# Patient Record
Sex: FEMALE | Race: WHITE | NOT HISPANIC OR LATINO | Employment: OTHER | URBAN - METROPOLITAN AREA
[De-identification: names, ages, dates, MRNs, and addresses within clinical notes are randomized per-mention and may not be internally consistent; named-entity substitution may affect disease eponyms.]

---

## 2019-06-02 ENCOUNTER — OFFICE VISIT (OUTPATIENT)
Dept: URGENT CARE | Facility: CLINIC | Age: 64
End: 2019-06-02
Payer: OTHER GOVERNMENT

## 2019-06-02 VITALS
SYSTOLIC BLOOD PRESSURE: 118 MMHG | OXYGEN SATURATION: 97 % | TEMPERATURE: 99 F | DIASTOLIC BLOOD PRESSURE: 77 MMHG | RESPIRATION RATE: 16 BRPM | WEIGHT: 142 LBS | HEART RATE: 72 BPM | HEIGHT: 66 IN | BODY MASS INDEX: 22.82 KG/M2

## 2019-06-02 DIAGNOSIS — M79.675 PAIN OF TOE OF LEFT FOOT: Primary | ICD-10-CM

## 2019-06-02 PROCEDURE — 73660 XR TOE 2 OR MORE VIEWS LEFT: ICD-10-PCS | Mod: LT,S$GLB,, | Performed by: RADIOLOGY

## 2019-06-02 PROCEDURE — 99203 OFFICE O/P NEW LOW 30 MIN: CPT | Mod: S$GLB,,, | Performed by: FAMILY MEDICINE

## 2019-06-02 PROCEDURE — 73660 X-RAY EXAM OF TOE(S): CPT | Mod: LT,S$GLB,, | Performed by: RADIOLOGY

## 2019-06-02 PROCEDURE — 99203 PR OFFICE/OUTPT VISIT, NEW, LEVL III, 30-44 MIN: ICD-10-PCS | Mod: S$GLB,,, | Performed by: FAMILY MEDICINE

## 2019-06-02 RX ORDER — NAPROXEN 500 MG/1
500 TABLET ORAL 2 TIMES DAILY
COMMUNITY
End: 2019-06-02 | Stop reason: ALTCHOICE

## 2019-06-02 RX ORDER — MELOXICAM 15 MG/1
15 TABLET ORAL DAILY PRN
Qty: 10 TABLET | Refills: 0 | Status: SHIPPED | OUTPATIENT
Start: 2019-06-02 | End: 2019-06-12

## 2019-06-02 RX ORDER — LEVOTHYROXINE SODIUM 75 UG/1
75 TABLET ORAL DAILY
COMMUNITY
End: 2022-12-15 | Stop reason: CLARIF

## 2019-06-02 NOTE — PROGRESS NOTES
"Subjective:       Patient ID: Sonja Ortiz is a 63 y.o. female.    Vitals:  height is 5' 6" (1.676 m) and weight is 64.4 kg (142 lb). Her oral temperature is 98.5 °F (36.9 °C). Her blood pressure is 118/77 and her pulse is 72. Her respiration is 16 and oxygen saturation is 97%.     Chief Complaint: Toe Pain    Injured left third toe this am. Toe is purple and swollen.    Toe Pain    The incident occurred 6 to 12 hours ago. The incident occurred at home. The injury mechanism was a direct blow. The pain is present in the left foot. The patient is experiencing no pain. She has tried ice and elevation for the symptoms. The treatment provided no relief.       Constitution: Negative for fatigue.   HENT: Negative for facial swelling and facial trauma.    Neck: Negative for neck stiffness.   Cardiovascular: Negative for chest trauma.   Eyes: Negative for eye trauma, double vision and blurred vision.   Gastrointestinal: Negative for abdominal trauma, abdominal pain and rectal bleeding.   Genitourinary: Negative for hematuria, missed menses, genital trauma and pelvic pain.   Musculoskeletal: Negative for pain, trauma, joint swelling and abnormal ROM of joint.   Skin: Positive for color change. Negative for wound, abrasion, laceration and bruising.   Neurological: Negative for dizziness, history of vertigo, light-headedness, coordination disturbances, altered mental status and loss of consciousness.   Hematologic/Lymphatic: Negative for history of bleeding disorder.   Psychiatric/Behavioral: Negative for altered mental status.       Objective:      Physical Exam   Constitutional: She appears well-developed and well-nourished.   HENT:   Head: Normocephalic and atraumatic.   Musculoskeletal:   Left Foot: Ecchymosis of 3rd digit. Pain with palpation of distal aspect of 3rd digit. Good ROM of digits.   Psychiatric: She has a normal mood and affect. Her behavior is normal.   Nursing note and vitals reviewed.        Type of " Interpretation: ED Physician (Independently Interpreted).  Radiology Procedure Done: Left 3rd Toe.  Interpretation: Non-displaced Fx of proximal phlaynx        Assessment:       1. Pain of toe of left foot        Plan:         Pain of toe of left foot  -     X-Ray Toe 2 or More Views Left; Future; Expected date: 06/02/2019  -     meloxicam (MOBIC) 15 MG tablet; Take 1 tablet (15 mg total) by mouth daily as needed.  Dispense: 10 tablet; Refill: 0        -     Toe buddy taped. She did not want to do post-op shoe.        -     F/U with PCP

## 2022-12-15 ENCOUNTER — OFFICE VISIT (OUTPATIENT)
Dept: URGENT CARE | Facility: CLINIC | Age: 67
End: 2022-12-15
Payer: MEDICARE

## 2022-12-15 VITALS
HEART RATE: 80 BPM | TEMPERATURE: 98 F | BODY MASS INDEX: 22.82 KG/M2 | SYSTOLIC BLOOD PRESSURE: 200 MMHG | RESPIRATION RATE: 16 BRPM | OXYGEN SATURATION: 100 % | DIASTOLIC BLOOD PRESSURE: 100 MMHG | WEIGHT: 142 LBS | HEIGHT: 66 IN

## 2022-12-15 DIAGNOSIS — R42 DIZZINESS: ICD-10-CM

## 2022-12-15 DIAGNOSIS — R20.0 ARM NUMBNESS LEFT: ICD-10-CM

## 2022-12-15 DIAGNOSIS — R03.0 ELEVATED BLOOD PRESSURE READING: Primary | ICD-10-CM

## 2022-12-15 DIAGNOSIS — R07.9 CHEST PAIN, UNSPECIFIED TYPE: ICD-10-CM

## 2022-12-15 PROBLEM — R00.2 PALPITATION: Status: ACTIVE | Noted: 2022-12-15

## 2022-12-15 PROBLEM — E87.6 HYPOKALEMIA: Status: ACTIVE | Noted: 2022-12-15

## 2022-12-15 PROBLEM — E03.9 HYPOTHYROIDISM: Status: ACTIVE | Noted: 2022-12-15

## 2022-12-15 PROBLEM — R20.2 NUMBNESS AND TINGLING IN LEFT ARM: Status: ACTIVE | Noted: 2022-12-15

## 2022-12-15 PROBLEM — Z71.89 ACP (ADVANCE CARE PLANNING): Status: ACTIVE | Noted: 2022-12-15

## 2022-12-15 PROCEDURE — 93005 ELECTROCARDIOGRAM TRACING: CPT | Mod: S$GLB,,, | Performed by: NURSE PRACTITIONER

## 2022-12-15 PROCEDURE — 99204 PR OFFICE/OUTPT VISIT, NEW, LEVL IV, 45-59 MIN: ICD-10-PCS | Mod: S$GLB,,, | Performed by: NURSE PRACTITIONER

## 2022-12-15 PROCEDURE — 93010 ELECTROCARDIOGRAM REPORT: CPT | Mod: S$PBB,,, | Performed by: INTERNAL MEDICINE

## 2022-12-15 PROCEDURE — 99204 OFFICE O/P NEW MOD 45 MIN: CPT | Mod: S$GLB,,, | Performed by: NURSE PRACTITIONER

## 2022-12-15 PROCEDURE — 93010 EKG 12-LEAD: ICD-10-PCS | Mod: S$PBB,,, | Performed by: INTERNAL MEDICINE

## 2022-12-15 PROCEDURE — 93005 EKG 12-LEAD: ICD-10-PCS | Mod: S$GLB,,, | Performed by: NURSE PRACTITIONER

## 2022-12-15 NOTE — PATIENT INSTRUCTIONS

## 2022-12-15 NOTE — PROGRESS NOTES
"Subjective:       Patient ID: Sonja Ortiz is a 67 y.o. female.    Vitals:  height is 5' 6" (1.676 m) and weight is 64.4 kg (142 lb). Her oral temperature is 97.8 °F (36.6 °C). Her blood pressure is 209/105 (abnormal) and her pulse is 80. Her respiration is 16 and oxygen saturation is 100%.     Chief Complaint: Hypertension    Pt states 20 mins ago numbing and tingling in left arm, BP at home 187/110, daughter states she looks weak. Patient wants EKG. Given aspirin 81mg 20 min ago.      Provider note begins below:  67-year-old female with PMHx of breast cancer and hypothyroidism presents to urgent care with left arm numbness and dizziness that started prior to arrival.  Patient also reports occasional palpitations.  Daughter reports that she was slow to respond while driving to the urgent care today.  Patient denies any current chest pain, shortness of breath, nausea, HA, diaphoresis, syncope, leg swelling, visual changes or abdominal pain.  Patient is awake alert.  Answering questions appropriately.  Speech is clear.  Afebrile.    Hypertension  This is a new problem. The current episode started today. The problem has been gradually worsening since onset. Associated symptoms include palpitations. Pertinent negatives include no chest pain. There are no associated agents to hypertension. There are no known risk factors for coronary artery disease. Past treatments include nothing.     Constitution: Negative. Negative for chills, sweating and fatigue.   HENT: Negative.  Negative for ear pain, facial swelling, congestion and sore throat.    Neck: Negative for painful lymph nodes.   Cardiovascular:  Positive for palpitations. Negative for chest trauma, chest pain and sob on exertion.   Eyes: Negative.  Negative for eye itching and eye pain.   Respiratory: Negative.  Negative for chest tightness, cough and asthma.    Gastrointestinal: Negative.  Negative for nausea, vomiting and diarrhea.   Endocrine: negative. cold " intolerance and excessive thirst.   Genitourinary: Negative.  Negative for dysuria, frequency, urgency and hematuria.   Musculoskeletal:  Negative for pain, trauma and joint pain.   Skin: Negative.  Negative for rash, wound and hives.   Allergic/Immunologic: Negative.  Negative for eczema, asthma, hives and itching.   Neurological:  Positive for numbness and tingling. Negative for disorientation and altered mental status.   Hematologic/Lymphatic: Negative.  Negative for swollen lymph nodes.   Psychiatric/Behavioral: Negative.  Negative for altered mental status, disorientation and confusion.      Objective:      Physical Exam   Constitutional: She is oriented to person, place, and time. She appears well-developed. She is cooperative.  Non-toxic appearance. She does not appear ill. No distress.   HENT:   Head: Normocephalic and atraumatic.   Ears:   Right Ear: Hearing, tympanic membrane, external ear and ear canal normal.   Left Ear: Hearing, tympanic membrane, external ear and ear canal normal.   Nose: Nose normal. No mucosal edema, rhinorrhea, nasal deformity or congestion. No epistaxis. Right sinus exhibits no maxillary sinus tenderness and no frontal sinus tenderness. Left sinus exhibits no maxillary sinus tenderness and no frontal sinus tenderness.   Mouth/Throat: Uvula is midline, oropharynx is clear and moist and mucous membranes are normal. No trismus in the jaw. Normal dentition. No uvula swelling. No posterior oropharyngeal erythema.   Eyes: Conjunctivae and lids are normal. No visual field deficit is present. Right eye exhibits no discharge. Left eye exhibits no discharge. No scleral icterus.   Neck: Trachea normal and phonation normal. Neck supple. No neck rigidity present.   Cardiovascular: Normal rate, regular rhythm, normal heart sounds and normal pulses.   Pulmonary/Chest: Effort normal and breath sounds normal. No stridor. No respiratory distress. She has no wheezes. She has no rhonchi. She has no  rales. She exhibits no tenderness.   Abdominal: Normal appearance and bowel sounds are normal. She exhibits no distension and no mass. Soft. There is no abdominal tenderness. There is no left CVA tenderness and no right CVA tenderness.   Musculoskeletal: Normal range of motion.         General: No deformity. Normal range of motion.   Lymphadenopathy:     She has no cervical adenopathy.   Neurological: no focal deficit. She is alert and oriented to person, place, and time. She has normal motor skills and normal sensation. She displays no tremor and facial symmetry. She exhibits normal muscle tone. Coordination: Heel to shin test normal. She shows no pronator drift. She displays no seizure activity. Gait and coordination normal. Coordination normal. GCS eye subscore is 4. GCS verbal subscore is 5. GCS motor subscore is 6.      Comments: Steady gate.   Skin: Skin is warm, dry, intact, not diaphoretic and not pale.   Psychiatric: Her speech is normal and behavior is normal. Mood, judgment and thought content normal.   Nursing note and vitals reviewed.  EKG NSR. left ventricular hypertrophy with repolarization abnormality HR 70 bpm.  No ST-elevation.      Assessment:       1. Elevated blood pressure reading    2. Chest pain, unspecified type    3. Arm numbness left    4. Dizziness          Plan:       FOLLOWUP  No follow-ups on file.     PATIENT INSTRUCTIONS  Patient Instructions   INSTRUCTIONS:  - Rest.  - Drink plenty of fluids.  - Take Tylenol and/or Ibuprofen as directed as needed for fever/pain.  Do not take more than the recommended dose.  - follow up with your PCP within the next 1-2 weeks as needed.  - You must understand that you have received an Urgent Care treatment only and that you may be released before all of your medical problems are known or treated.   - You, the patient, will arrange for follow up care as instructed.   - If your condition worsens or fails to improve we recommend that you receive another  evaluation at the ER immediately or contact your PCP to discuss your concerns.   - You can call [phone] or [phone] to help schedule an appointment with the appropriate provider.     -If you smoke cigarettes, it would be beneficial for you to stop.         THANK YOU FOR ALLOWING ME TO PARTICIPATE IN YOUR HEALTHCARE,     Raleigh Conley NP   Elevated blood pressure reading  -     Refer to Emergency Dept.    Chest pain, unspecified type  -     IN OFFICE EKG 12-LEAD (to Muse)    Arm numbness left  -     Refer to Emergency Dept.    Dizziness  -     Refer to Emergency Dept.

## 2022-12-16 PROBLEM — Q21.12 PFO (PATENT FORAMEN OVALE): Status: ACTIVE | Noted: 2022-12-16

## 2023-01-13 ENCOUNTER — OFFICE VISIT (OUTPATIENT)
Dept: CARDIOLOGY | Facility: CLINIC | Age: 68
End: 2023-01-13
Payer: OTHER GOVERNMENT

## 2023-01-13 VITALS
SYSTOLIC BLOOD PRESSURE: 132 MMHG | BODY MASS INDEX: 23.76 KG/M2 | HEIGHT: 65 IN | HEART RATE: 71 BPM | WEIGHT: 142.63 LBS | DIASTOLIC BLOOD PRESSURE: 81 MMHG

## 2023-01-13 DIAGNOSIS — I10 PRIMARY HYPERTENSION: ICD-10-CM

## 2023-01-13 DIAGNOSIS — R00.2 PALPITATION: ICD-10-CM

## 2023-01-13 DIAGNOSIS — E03.9 HYPOTHYROIDISM, UNSPECIFIED TYPE: ICD-10-CM

## 2023-01-13 DIAGNOSIS — E78.5 DYSLIPIDEMIA: ICD-10-CM

## 2023-01-13 DIAGNOSIS — Q21.12 PFO (PATENT FORAMEN OVALE): ICD-10-CM

## 2023-01-13 PROCEDURE — 99999 PR PBB SHADOW E&M-EST. PATIENT-LVL III: CPT | Mod: PBBFAC,,,

## 2023-01-13 PROCEDURE — 99214 PR OFFICE/OUTPT VISIT, EST, LEVL IV, 30-39 MIN: ICD-10-PCS | Mod: S$PBB,,,

## 2023-01-13 PROCEDURE — 99999 PR PBB SHADOW E&M-EST. PATIENT-LVL III: ICD-10-PCS | Mod: PBBFAC,,,

## 2023-01-13 PROCEDURE — 99213 OFFICE O/P EST LOW 20 MIN: CPT | Mod: PBBFAC,PO

## 2023-01-13 PROCEDURE — 99214 OFFICE O/P EST MOD 30 MIN: CPT | Mod: S$PBB,,,

## 2023-01-13 RX ORDER — SODIUM CHLORIDE 9 MG/ML
INJECTION, SOLUTION INTRAVENOUS ONCE
Status: CANCELLED | OUTPATIENT
Start: 2023-01-13 | End: 2023-01-13

## 2023-01-13 RX ORDER — ROSUVASTATIN CALCIUM 10 MG/1
10 TABLET, COATED ORAL DAILY
Qty: 90 TABLET | Refills: 3 | Status: SHIPPED | OUTPATIENT
Start: 2023-01-13 | End: 2023-12-04

## 2023-01-13 NOTE — ASSESSMENT & PLAN NOTE
Total 263    Start on Crestor 10 mg and uptitrate as needed     Low level/low impact aerobic exercise 5x's/wk recommended. Heart healthy diet and risk factor modification discussed with patient.

## 2023-01-13 NOTE — PATIENT INSTRUCTIONS
DAYANA    Arrive for your procedure at:  [facility]      FASTING:  You MAY NOT have anything to eat or drink AFTER MIDNIGHT.  If your procedure is scheduled in the afternoon, you may have a LIGHT BREAKFAST BEFORE 6:00 A.M.  For example: Two slices of toast; black coffee or black tea.    MEDICATIONS:  You may take your regular morning medications with a small sip of water.     Hold or adjust the following:  Fluid pills.  Diabetes medications.    Continue: Coumadin, Plavix, Effient, Aspirin, Anti-coagulants, Blood thinners    Please refer to pre-op instructions received from [facility].

## 2023-01-13 NOTE — ASSESSMENT & PLAN NOTE
Will arrange for DAYANA with Dr. Mohr given recurrence of symptoms and positive bubble study during admit   Risks and benefits discussed and she wishes to proceed

## 2023-01-13 NOTE — PROGRESS NOTES
" Subjective:    Patient ID:  Sonja Ortiz is a 67 y.o. female patient here for evaluation Hypertension (Post hosp f/u )      History of Present Illness:     Mrs. Ortiz is a new patient to our clinic after she was admitted to [facility] with stroke like symptoms. MRI and CT head negative. She had a negative nuclear stress test and an echo that showed pEF with positive bubble study. She is going to follow up with Dr. Mohr from this point forward in our clinic.   She has been feeling much better- she did go to the [place] ED a few days after discharge from [facility] with recurring symptoms-- weakness, left arm numbness and tingling, but was sent home as all her tests came back normal again per patient.   She does still have occasional finger tingling but if she "pops her wrist" it goes away.   She does still have occasional palpitations but they are better with metoprolol. She is on thyroid medication at higher than needed doses as to suppress recurrence of thyroid cancer she was told. She is going to establish care with endocrine here soon.   She does also get dizzy and feels off balance from time to time. Does have history of vertigo.  No SOB, weakness, chest pain, dizziness, syncope, falls,  neck/arm/jaw pain.     She was evaluated by neurology as an outpatient who did not feel she had TIA and referred her to ENT for a vertigo workup.    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  MUSCULOSKELETAL: No myalgias  NEURO: No lightheadedness or dizziness  EYES: No Double vision, blurry, vision or headache        Objective        Vitals:    01/13/23 0816   BP: 132/81   Pulse: 71       LIPIDS - LAST 2   Lab Results   Component Value Date    CHOL 263 (H) 12/16/2022    HDL 69 12/16/2022    LDLCALC 178.6 (H) 12/16/2022    TRIG 77 12/16/2022    CHOLHDL 26.2 12/16/2022     CBC - LAST 2  Lab Results   Component Value Date    WBC 8.13 12/16/2022    WBC 7.27 12/15/2022    RBC 4.43 12/16/2022    RBC 4.32 12/15/2022    HGB 13.7 12/16/2022    HGB 13.4 12/15/2022    HCT 40.0 12/16/2022    HCT 38.1 12/15/2022    MCV 90 12/16/2022    MCV 88 12/15/2022    MCH 30.9 12/16/2022    MCH 31.0 12/15/2022    MCHC 34.3 12/16/2022    MCHC 35.2 12/15/2022    RDW 13.6 12/16/2022    RDW 13.2 12/15/2022     12/16/2022     12/15/2022    MPV 9.6 12/16/2022    MPV 9.1 (L) 12/15/2022    GRAN 3.8 12/16/2022    GRAN 46.5 12/16/2022    LYMPH 3.3 12/16/2022    LYMPH 40.7 12/16/2022    MONO 0.9 12/16/2022    MONO 11.4 12/16/2022    BASO 0.05 12/16/2022    BASO 0.03 12/15/2022    NRBC 0 12/16/2022    NRBC 0 12/15/2022     CHEMISTRY & LIVER FUNCTION - LAST 2  Lab Results   Component Value Date     12/16/2022     (L) 12/15/2022    K 4.3 12/16/2022    K 3.4 (L) 12/15/2022     12/16/2022    CL 99 12/15/2022    CO2 29 12/16/2022    CO2 27 12/15/2022    ANIONGAP 4 (L) 12/16/2022    ANIONGAP 7 (L) 12/15/2022    BUN 7 12/16/2022    BUN 7 12/15/2022    CREATININE 0.57 12/16/2022    CREATININE 0.53 12/15/2022    GLU 84 12/16/2022     (H) 12/15/2022    CALCIUM 9.5 12/16/2022    CALCIUM 9.2 12/15/2022    MG 2.3 12/16/2022    ALBUMIN 4.3 12/16/2022    ALBUMIN 4.4 12/15/2022    PROT 8.1 12/16/2022     PROT 8.0 12/15/2022    ALKPHOS 63 12/16/2022    ALKPHOS 77 12/15/2022    ALT 22 12/16/2022    ALT 23 12/15/2022    AST 41 (H) 12/16/2022    AST 36 12/15/2022    BILITOT 1.8 (H) 12/16/2022    BILITOT 1.5 (H) 12/15/2022      CARDIAC PROFILE - LAST 2  Lab Results   Component Value Date    TROPONINI 0.021 12/15/2022    TROPONINI <0.012 12/15/2022      COAGULATION - LAST 2  Lab Results   Component Value Date    LABPT 12.9 12/15/2022    INR 1.0 12/15/2022    APTT 32.9 12/15/2022     ENDOCRINE & PSA - LAST 2  Lab Results   Component Value Date    HGBA1C 5.2 12/16/2022    TSH 4.600 (H) 12/15/2022      ECHOCARDIOGRAM RESULTS  Results for orders placed during the hospital encounter of 12/15/22    Echo Saline Bubble? Yes    Interpretation Summary  · The left ventricle is normal in size with normal systolic function.  · The estimated ejection fraction is 60%.  · Normal left ventricular diastolic function.  · Normal right ventricular size with normal right ventricular systolic function.  · Normal central venous pressure (3 mmHg).  · The estimated PA systolic pressure is 25 mmHg.  · Study is positive for intercardiac shunt that is right to left.      CURRENT/PREVIOUS VISIT EKG  Results for orders placed or performed during the hospital encounter of 12/15/22   ECG 12 lead    Collection Time: 12/15/22 12:55 PM    Narrative    Test Reason : R29.818,    Vent. Rate : 072 BPM     Atrial Rate : 072 BPM     P-R Int : 146 ms          QRS Dur : 086 ms      QT Int : 402 ms       P-R-T Axes : 064 -24 078 degrees     QTc Int : 440 ms    Normal sinus rhythm  Minimal voltage criteria for LVH, may be normal variant ( R in aVL )  Abnormal ECG  Confirmed by Natalie Abraham ([id number]) on 12/16/2022 11:42:24 AM    Referred By: CHRISTINA   SELF           Confirmed By:Natalie Abraham     No valid procedures specified.   Results for orders placed during the hospital encounter of 12/15/22    Nuclear Stress - Cardiology Interpreted    Interpretation Summary     Normal myocardial perfusion scan. There is no evidence of myocardial ischemia or infarction.    The visually estimated ejection fraction is normal at rest.    There is normal wall motion at rest.    The ECG portion of the study is negative for ischemia.    There are no prior studies for comparison.    No valid procedures specified.    PHYSICAL EXAM  CONSTITUTIONAL: Well built, well nourished in no apparent distress  NECK: no carotid bruit, no JVD  LUNGS: CTA  CHEST WALL: no tenderness  HEART: regular rate and rhythm, S1, S2 normal, no murmur, click, rub or gallop   ABDOMEN: soft, non-tender; bowel sounds normal; no masses,  no organomegaly  EXTREMITIES: Extremities normal, no edema, no calf tenderness noted  NEURO: AAO X 3    I HAVE REVIEWED :    The vital signs, nurses notes, and all the pertinent radiology and labs.    Current Outpatient Medications   Medication Instructions    aspirin (ECOTRIN) 81 mg, Oral, Three times weekly    cetirizine (ZYRTEC) 10 mg, Oral, Daily PRN    ergocalciferol (ERGOCALCIFEROL) 50,000 Units, Oral, Every 7 days    GENTEAL, HYPROMELLOSE, OPHT 2 drops, Both Eyes, Daily PRN    metoprolol succinate (TOPROL-XL) 25 mg, Oral, Daily    rosuvastatin (CRESTOR) 10 mg, Oral, Daily    SYNTHROID 88 mcg, Oral, Daily        Assessment & Plan     Dyslipidemia  Total 263    Start on Crestor 10 mg and uptitrate as needed     Low level/low impact aerobic exercise 5x's/wk recommended. Heart healthy diet and risk factor modification discussed with patient.       PFO (patent foramen ovale)  Will arrange for DAYANA with Dr. Mohr given recurrence of symptoms and positive bubble study during admit   Risks and benefits discussed and she wishes to proceed     Hypothyroidism  Following with endocrine soon     Primary hypertension  Continue metoprolol 25 mg daily   Low Na diet     Palpitation  Start Magox   Continue torpol 25 mg daily           Follow up in about 6 weeks (around 2/24/2023).

## 2023-02-24 ENCOUNTER — OFFICE VISIT (OUTPATIENT)
Dept: CARDIOLOGY | Facility: CLINIC | Age: 68
End: 2023-02-24
Payer: MEDICARE

## 2023-02-24 VITALS
HEIGHT: 65 IN | WEIGHT: 144.38 LBS | SYSTOLIC BLOOD PRESSURE: 159 MMHG | BODY MASS INDEX: 24.06 KG/M2 | DIASTOLIC BLOOD PRESSURE: 95 MMHG | HEART RATE: 68 BPM

## 2023-02-24 DIAGNOSIS — I10 PRIMARY HYPERTENSION: ICD-10-CM

## 2023-02-24 DIAGNOSIS — R20.2 NUMBNESS AND TINGLING IN LEFT ARM: ICD-10-CM

## 2023-02-24 DIAGNOSIS — R20.0 NUMBNESS AND TINGLING IN LEFT ARM: ICD-10-CM

## 2023-02-24 DIAGNOSIS — E78.5 DYSLIPIDEMIA: ICD-10-CM

## 2023-02-24 DIAGNOSIS — Q21.12 PFO (PATENT FORAMEN OVALE): ICD-10-CM

## 2023-02-24 DIAGNOSIS — E03.9 HYPOTHYROIDISM, UNSPECIFIED TYPE: ICD-10-CM

## 2023-02-24 PROBLEM — R00.2 PALPITATION: Status: RESOLVED | Noted: 2022-12-15 | Resolved: 2023-02-24

## 2023-02-24 PROCEDURE — 99999 PR PBB SHADOW E&M-EST. PATIENT-LVL III: CPT | Mod: PBBFAC,,,

## 2023-02-24 PROCEDURE — 99214 PR OFFICE/OUTPT VISIT, EST, LEVL IV, 30-39 MIN: ICD-10-PCS | Mod: S$PBB,,,

## 2023-02-24 PROCEDURE — 99213 OFFICE O/P EST LOW 20 MIN: CPT | Mod: PBBFAC,PO

## 2023-02-24 PROCEDURE — 99214 OFFICE O/P EST MOD 30 MIN: CPT | Mod: S$PBB,,,

## 2023-02-24 PROCEDURE — 99999 PR PBB SHADOW E&M-EST. PATIENT-LVL III: ICD-10-PCS | Mod: PBBFAC,,,

## 2023-02-24 NOTE — ASSESSMENT & PLAN NOTE
I gave her Crestor last visit but she has not started it yet because she wants to try diet first   PCP will recheck in the next few months

## 2023-02-24 NOTE — ASSESSMENT & PLAN NOTE
Bp high today but reports controlled reading 100-130 systolic and 69-90 diastolic at home   Continue torpol     Low level/low impact aerobic exercise 5x's/wk recommended. Heart healthy diet and risk factor modification discussed with patient.

## 2023-02-24 NOTE — PROGRESS NOTES
Subjective:    Patient ID:  Sonja Ortiz is a 67 y.o. female patient here for evaluation PFO/ASD (Review DAYANA results form [facility] 02/07/23)      History of Present Illness:     Mrs. Ortiz is a new patient to our clinic after she was admitted to [facility] with stroke like symptoms. MRI and CT head negative. She had a negative nuclear stress test and an echo that showed pEF with positive bubble study. We then preformed a DAYANA on her which did confirm the presence of a small PFO, but likely not the cause of any of her symptomology and not warranting any intervention. She was evaluated by neurology as an outpatient who did not feel she had TIA and referred her to ENT for a vertigo workup.She has not yet seen ENT but taking a zyrtec daily has helped her.   She does also get dizzy and feels off balance from time to time. Does have history of vertigo.  No SOB, weakness, chest pain, dizziness, syncope, falls,  neck/arm/jaw pain.         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  MUSCULOSKELETAL: No myalgias  NEURO: No lightheadedness or dizziness  EYES: No Double vision, blurry, vision or headache        Objective        Vitals:    02/24/23 0946   BP: (!) 159/95   Pulse: 68       LIPIDS - LAST 2   Lab Results   Component Value Date    CHOL 263 (H) 12/16/2022    HDL 69 12/16/2022    LDLCALC 178.6 (H) 12/16/2022    TRIG 77 12/16/2022    CHOLHDL 26.2 12/16/2022     CBC - LAST 2  Lab Results   Component Value Date    WBC 8.13 12/16/2022    WBC 7.27 12/15/2022    RBC 4.43 12/16/2022    RBC 4.32 12/15/2022    HGB 13.7 12/16/2022    HGB 13.4 12/15/2022    HCT 40.0 12/16/2022    HCT 38.1 12/15/2022    MCV 90 12/16/2022    MCV 88 12/15/2022    MCH 30.9 12/16/2022    MCH 31.0 12/15/2022    MCHC 34.3 12/16/2022    MCHC 35.2 12/15/2022    RDW 13.6 12/16/2022    RDW 13.2 12/15/2022     12/16/2022     12/15/2022    MPV 9.6 12/16/2022    MPV 9.1 (L) 12/15/2022    GRAN 3.8 12/16/2022    GRAN 46.5 12/16/2022    LYMPH 3.3 12/16/2022    LYMPH 40.7 12/16/2022    MONO 0.9 12/16/2022    MONO 11.4 12/16/2022    BASO 0.05 12/16/2022    BASO 0.03 12/15/2022    NRBC 0 12/16/2022    NRBC 0 12/15/2022     CHEMISTRY & LIVER FUNCTION - LAST 2  Lab Results   Component Value Date     12/16/2022     (L) 12/15/2022    K 4.3 12/16/2022    K 3.4 (L) 12/15/2022     12/16/2022    CL 99 12/15/2022    CO2 29 12/16/2022    CO2 27 12/15/2022    ANIONGAP 4 (L) 12/16/2022    ANIONGAP 7 (L) 12/15/2022    BUN 7 12/16/2022    BUN 7 12/15/2022    CREATININE 0.57 12/16/2022    CREATININE 0.53 12/15/2022    GLU 84 12/16/2022     (H) 12/15/2022    CALCIUM 9.5 12/16/2022    CALCIUM 9.2 12/15/2022    MG 2.3 12/16/2022    ALBUMIN 4.3 12/16/2022    ALBUMIN 4.4 12/15/2022    PROT 8.1 12/16/2022    PROT 8.0 12/15/2022    ALKPHOS 63 12/16/2022    ALKPHOS 77 12/15/2022    ALT 22 12/16/2022    ALT 23 12/15/2022    AST 41 (H) 12/16/2022    AST 36 12/15/2022    BILITOT 1.8 (H) 12/16/2022    BILITOT  1.5 (H) 12/15/2022      CARDIAC PROFILE - LAST 2  Lab Results   Component Value Date    TROPONINI 0.021 12/15/2022    TROPONINI <0.012 12/15/2022      COAGULATION - LAST 2  Lab Results   Component Value Date    LABPT 12.9 12/15/2022    INR 1.0 12/15/2022    APTT 32.9 12/15/2022     ENDOCRINE & PSA - LAST 2  Lab Results   Component Value Date    HGBA1C 5.2 12/16/2022    TSH 4.600 (H) 12/15/2022      ECHOCARDIOGRAM RESULTS  Results for orders placed during the hospital encounter of 12/15/22    Echo Saline Bubble? Yes    Interpretation Summary  · The left ventricle is normal in size with normal systolic function.  · The estimated ejection fraction is 60%.  · Normal left ventricular diastolic function.  · Normal right ventricular size with normal right ventricular systolic function.  · Normal central venous pressure (3 mmHg).  · The estimated PA systolic pressure is 25 mmHg.  · Study is positive for intercardiac shunt that is right to left.      CURRENT/PREVIOUS VISIT EKG  Results for orders placed or performed during the hospital encounter of 12/15/22   ECG 12 lead    Collection Time: 12/15/22 12:55 PM    Narrative    Test Reason : R29.818,    Vent. Rate : 072 BPM     Atrial Rate : 072 BPM     P-R Int : 146 ms          QRS Dur : 086 ms      QT Int : 402 ms       P-R-T Axes : 064 -24 078 degrees     QTc Int : 440 ms    Normal sinus rhythm  Minimal voltage criteria for LVH, may be normal variant ( R in aVL )  Abnormal ECG  Confirmed by Natalie Abraham ([id number]) on 12/16/2022 11:42:24 AM    Referred By: [email or web address]   SELF           Confirmed By:Natalie Abraham     No valid procedures specified.   Results for orders placed during the hospital encounter of 12/15/22    Nuclear Stress - Cardiology Interpreted    Interpretation Summary    Normal myocardial perfusion scan. There is no evidence of myocardial ischemia or infarction.    The visually estimated ejection fraction is normal at rest.    There is normal wall motion at rest.     The ECG portion of the study is negative for ischemia.    There are no prior studies for comparison.    No valid procedures specified.    PHYSICAL EXAM  CONSTITUTIONAL: Well built, well nourished in no apparent distress  NECK: no carotid bruit, no JVD  LUNGS: CTA  CHEST WALL: no tenderness  HEART: regular rate and rhythm, S1, S2 normal, no murmur, click, rub or gallop   ABDOMEN: soft, non-tender; bowel sounds normal; no masses,  no organomegaly  EXTREMITIES: Extremities normal, no edema, no calf tenderness noted  NEURO: AAO X 3    I HAVE REVIEWED :    The vital signs, nurses notes, and all the pertinent radiology and labs.    Current Outpatient Medications   Medication Instructions    aspirin (ECOTRIN) 81 mg, Oral, Three times weekly    cetirizine (ZYRTEC) 10 mg, Oral, Daily PRN    ergocalciferol (ERGOCALCIFEROL) 50,000 Units, Oral, Every 7 days    GENTEAL, HYPROMELLOSE, OPHT 2 drops, Both Eyes, Daily PRN    metoprolol succinate (TOPROL-XL) 25 mg, Oral, Daily    rosuvastatin (CRESTOR) 10 mg, Oral, Daily    SYNTHROID 88 mcg, Oral, Daily        Assessment & Plan     Dyslipidemia  I gave her Crestor last visit but she has not started it yet because she wants to try diet first   PCP will recheck in the next few months     PFO (patent foramen ovale)  Small and no intervention needed per DAYANA  Asymptomatic at this time     Primary hypertension  Bp high today but reports controlled reading 100-130 systolic and 69-90 diastolic at home   Continue torpol     Low level/low impact aerobic exercise 5x's/wk recommended. Heart healthy diet and risk factor modification discussed with patient.       Hypothyroidism  Per PCP     Numbness and tingling in left arm  Resolved    IS on baby ASA three times weekly per neuro in the case she did have unidentifiable vascular event.     Follow up in about 9 months (around 11/24/2023).

## 2023-10-09 ENCOUNTER — PATIENT MESSAGE (OUTPATIENT)
Dept: CARDIOLOGY | Facility: CLINIC | Age: 68
End: 2023-10-09
Payer: MEDICARE

## 2023-11-30 PROBLEM — E78.2 MIXED HYPERLIPIDEMIA: Status: ACTIVE | Noted: 2023-01-13

## 2023-11-30 NOTE — PROGRESS NOTES
"Subjective:    Patient ID:  Sonja Ortiz is a 68 y.o. female who presents for follow-up of Hyperlipidemia and Hypertension      Problem List Items Addressed This Visit          Cardiac/Vascular    Mixed hyperlipidemia    PFO (patent foramen ovale) - Primary    Primary hypertension       HPI    Patient was last seen at [facility] and then by Renetta Carrillo.    On assessment today, the patient states that she feels OK.    No chest pain.  No shortness of breath.  No palpitations.    Home BP - Some lability, but mostly OK       Objective:     Vitals:    12/04/23 1400   BP: 134/80   BP Location: Left arm   Patient Position: Sitting   BP Method: Medium (Automatic)   Pulse: 66   Weight: 68.4 kg (150 lb 12.7 oz)   Height: 5' 5" (1.651 m)       BP Readings from Last 5 Encounters:   12/04/23 134/80   02/24/23 (!) 159/95   02/07/23 (!) 147/81   01/13/23 132/81   12/16/22 (!) 155/89        Physical Exam  Vitals and nursing note reviewed.   Constitutional:       General: She is not in acute distress.     Appearance: She is well-developed.   HENT:      Head: Normocephalic and atraumatic.   Neck:      Vascular: No JVD.   Cardiovascular:      Rate and Rhythm: Normal rate and regular rhythm.      Heart sounds: Normal heart sounds. No murmur heard.     No friction rub. No gallop.   Pulmonary:      Effort: Pulmonary effort is normal. No respiratory distress.      Breath sounds: Normal breath sounds. No wheezing or rales.   Abdominal:      General: Bowel sounds are normal.      Palpations: Abdomen is soft.      Tenderness: There is no abdominal tenderness. There is no guarding or rebound.   Musculoskeletal:         General: No tenderness.      Cervical back: Neck supple.   Skin:     General: Skin is warm and dry.   Neurological:      Mental Status: She is alert and oriented to person, place, and time.   Psychiatric:         Behavior: Behavior normal.             Current Outpatient Medications   Medication " Instructions    aspirin (ECOTRIN) 81 mg, Oral, Three times weekly    cetirizine (ZYRTEC) 10 mg, Oral, Daily PRN    ergocalciferol (ERGOCALCIFEROL) 50,000 Units, Oral, Every 7 days    GENTEAL, HYPROMELLOSE, OPHT 2 drops, Both Eyes, Daily PRN    metoprolol succinate (TOPROL-XL) 25 mg, Oral, Daily    rosuvastatin (CRESTOR) 10 mg, Oral, Daily    SYNTHROID 88 mcg, Oral, Daily       Lipid Panel:   Lab Results   Component Value Date    CHOL 180 11/13/2023    HDL 64 11/13/2023    LDLCALC 99 11/13/2023    TRIG 91 11/13/2023    CHOLHDL 26.2 12/16/2022       The 10-year ASCVD risk score (Charo RODRIGUEZ, et al., 2019) is: 10.3%    Values used to calculate the score:      Age: 68 years      Sex: Female      Is Non- : No      Diabetic: No      Tobacco smoker: No      Systolic Blood Pressure: 134 mmHg      Is BP treated: Yes      HDL Cholesterol: 64 mg/dL      Total Cholesterol: 180 mg/dL    All pertinent labs, imaging, and EKGs reviewed.  Patient's most recent EKG tracing was personally interpreted by this provider.    Most Recent EKG Results  Results for orders placed or performed during the hospital encounter of 12/15/22   ECG 12 lead    Collection Time: 12/15/22 12:55 PM    Narrative    Test Reason : R29.818,    Vent. Rate : 072 BPM     Atrial Rate : 072 BPM     P-R Int : 146 ms          QRS Dur : 086 ms      QT Int : 402 ms       P-R-T Axes : 064 -24 078 degrees     QTc Int : 440 ms    Normal sinus rhythm  Minimal voltage criteria for LVH, may be normal variant ( R in aVL )  Abnormal ECG  Confirmed by Natalie Abraham ([id number]) on 12/16/2022 11:42:24 AM    Referred By: CHRISTINA   SELF           Confirmed By:Natalie Abraham       Most Recent Echocardiogram Results  Results for orders placed during the hospital encounter of 02/07/23    Transesophageal echo (DAYANA) with possible cardioversion    Interpretation Summary  · Normal systolic function.  · The estimated ejection fraction is 60%.  · Normal right ventricular  size with normal right ventricular systolic function.  · Normal appearing left atrial appendage. No thrombus is present in the appendage. Normal appendage velocities.  · There is mild mitral prolapse of the posterior mitral leaflet without significant regurgitation.  · There is a small patent foramen ovale present with right to left shunting indicated by saline contrast.  · No atrial septal aneurysm appreciated.      Most Recent Nuclear Stress Test Results  Results for orders placed during the hospital encounter of 12/15/22    Nuclear Stress - Cardiology Interpreted    Interpretation Summary    Normal myocardial perfusion scan. There is no evidence of myocardial ischemia or infarction.    The visually estimated ejection fraction is normal at rest.    There is normal wall motion at rest.    The ECG portion of the study is negative for ischemia.    There are no prior studies for comparison.      Most Recent Cardiac PET Stress Test Results  No results found for this or any previous visit.      Most Recent Cardiovascular Angiogram results  No results found for this or any previous visit.      Other Most Recent Cardiology Results  Results for orders placed during the hospital encounter of 12/15/22    CARDIAC MONITORING STRIPS        Assessment:       1. PFO (patent foramen ovale)    2. Primary hypertension    3. Mixed hyperlipidemia         Plan:     Symptoms OK today  BP/Pulse OK today  Most recent echocardiogram reviewed personally     Continue aspirin 81 mg PO Daily   Continue metoprolol succinate 25 mg PO Daily   Not taking statin, focusing diet at this time    Continue other cardiac medications  Mediterranean Diet/Cardiovascular Exercise Program    Patient queried and all questions were answered.    F/u in 1 year to reassess      Signed:    David Mohr MD  12/4/2023 3:58 PM

## 2023-12-04 ENCOUNTER — OFFICE VISIT (OUTPATIENT)
Dept: CARDIOLOGY | Facility: CLINIC | Age: 68
End: 2023-12-04
Payer: MEDICARE

## 2023-12-04 VITALS
SYSTOLIC BLOOD PRESSURE: 134 MMHG | HEIGHT: 65 IN | HEART RATE: 66 BPM | WEIGHT: 150.81 LBS | BODY MASS INDEX: 25.13 KG/M2 | DIASTOLIC BLOOD PRESSURE: 80 MMHG

## 2023-12-04 DIAGNOSIS — E78.2 MIXED HYPERLIPIDEMIA: ICD-10-CM

## 2023-12-04 DIAGNOSIS — Q21.12 PFO (PATENT FORAMEN OVALE): Primary | ICD-10-CM

## 2023-12-04 DIAGNOSIS — I10 PRIMARY HYPERTENSION: ICD-10-CM

## 2023-12-04 PROCEDURE — 99214 PR OFFICE/OUTPT VISIT, EST, LEVL IV, 30-39 MIN: ICD-10-PCS | Mod: S$PBB,,, | Performed by: INTERNAL MEDICINE

## 2023-12-04 PROCEDURE — 99214 OFFICE O/P EST MOD 30 MIN: CPT | Mod: S$PBB,,, | Performed by: INTERNAL MEDICINE

## 2023-12-04 PROCEDURE — 99213 OFFICE O/P EST LOW 20 MIN: CPT | Mod: PBBFAC,PO | Performed by: INTERNAL MEDICINE

## 2023-12-04 PROCEDURE — 99999 PR PBB SHADOW E&M-EST. PATIENT-LVL III: ICD-10-PCS | Mod: PBBFAC,,, | Performed by: INTERNAL MEDICINE

## 2023-12-04 PROCEDURE — 99999 PR PBB SHADOW E&M-EST. PATIENT-LVL III: CPT | Mod: PBBFAC,,, | Performed by: INTERNAL MEDICINE

## 2025-04-11 NOTE — PROGRESS NOTES
"Subjective:    Patient ID:  Sonja Ortiz is a 69 y.o. female who presents for follow-up of Hyperlipidemia and Hypertension      Problem List Items Addressed This Visit          Cardiac/Vascular    Mixed hyperlipidemia - Primary    Relevant Orders    IN OFFICE EKG 12-LEAD (to Muse)    PFO (patent foramen ovale)    Relevant Orders    IN OFFICE EKG 12-LEAD (to Muse)    Primary hypertension    Relevant Medications    amLODIPine (NORVASC) 2.5 MG tablet    Other Relevant Orders    IN OFFICE EKG 12-LEAD (to [place])         Patient was last seen on  12/04/2023 at which time  she was doing okay from cardiac standpoint.    History of Present Illness    CHIEF COMPLAINT:  Ms. Ortiz presents today for follow up of BP concerns    BLOOD PRESSURE AND CARDIOVASCULAR:  She has been taking metoprolol for 4 years, initially prescribed for tachycardia. Prior to metoprolol initiation, she experienced near-syncopal episodes with preserved consciousness and visual disturbances.     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    Parts of this note were transcribed using voice recognition and generative artificial intelligence software ([employer]).  Please excuse any grammatical or syntax errors and reach out to me with any questions or clarifications needed.             Objective:     Vitals:    04/17/25 0852 04/17/25 0858   BP: (!) 152/84 (!) 157/83   BP Location: Left arm Left arm   Patient Position: Sitting Sitting   Pulse: (!) 57 (!) 56   Weight: 72.6 kg (160 lb 0.9 oz)    Height: 5' 5" (1.651 m)        BP Readings from Last 5 Encounters:   04/17/25 (!) 157/83   12/04/23 134/80   02/24/23 (!) 159/95   02/07/23 (!) 147/81   01/13/23 132/81        Physical Exam  Vitals and nursing note reviewed.   Constitutional:       General: She is not in acute distress.     Appearance: She is well-developed.   HENT:      Head: Normocephalic and " atraumatic.   Neck:      Vascular: No JVD.   Cardiovascular:      Rate and Rhythm: Normal rate and regular rhythm.      Heart sounds: Normal heart sounds. No murmur heard.     No friction rub. No gallop.   Pulmonary:      Effort: Pulmonary effort is normal. No respiratory distress.      Breath sounds: Normal breath sounds. No wheezing or rales.   Abdominal:      General: Bowel sounds are normal.      Palpations: Abdomen is soft.      Tenderness: There is no abdominal tenderness. There is no guarding or rebound.   Musculoskeletal:         General: No tenderness.      Cervical back: Neck supple.   Skin:     General: Skin is warm and dry.   Neurological:      Mental Status: She is alert and oriented to person, place, and time.   Psychiatric:         Behavior: Behavior normal.             Current Outpatient Medications   Medication Instructions    amLODIPine (NORVASC) 2.5 mg, Oral, Daily    aspirin (ECOTRIN) 81 mg, Three times weekly    cetirizine (ZYRTEC) 10 mg, Daily PRN    ergocalciferol (ERGOCALCIFEROL) 50,000 Units, Every 7 days    GENTEAL, HYPROMELLOSE, OPHT 2 drops, Daily PRN    SYNTHROID 88 mcg, Daily       Lipid Panel:   Lab Results   Component Value Date    CHOL 236 (H) 03/26/2024    HDL 72 03/26/2024    LDLCALC 152 (H) 03/26/2024    TRIG 71 03/26/2024    CHOLHDL 26.2 12/16/2022       The 10-year ASCVD risk score (Charo RODRIGUEZ, et al., 2019) is: 16.5%    Values used to calculate the score:      Age: 69 years      Sex: Female      Is Non- : No      Diabetic: No      Tobacco smoker: No      Systolic Blood Pressure: 157 mmHg      Is BP treated: Yes      HDL Cholesterol: 72 mg/dL      Total Cholesterol: 236 mg/dL    Most Recent EKG Results  Results for orders placed or performed during the hospital encounter of 12/15/22   ECG 12 lead    Collection Time: 12/15/22 12:55 PM    Narrative    Test Reason : R29.818,    Vent. Rate : 072 BPM     Atrial Rate : 072 BPM     P-R Int : 146 ms          QRS  Dur : 086 ms      QT Int : 402 ms       P-R-T Axes : 064 -24 078 degrees     QTc Int : 440 ms    Normal sinus rhythm  Minimal voltage criteria for LVH, may be normal variant ( R in aVL )  Abnormal ECG  Confirmed by Natalie Abraham ([id number]) on 12/16/2022 11:42:24 AM    Referred By: CHRISTINA   SELF           Confirmed By:Natalie Abraham       Most Recent Echocardiogram Results  Results for orders placed during the hospital encounter of 02/07/23    Transesophageal echo (DAYANA) with possible cardioversion    Interpretation Summary  · Normal systolic function.  · The estimated ejection fraction is 60%.  · Normal right ventricular size with normal right ventricular systolic function.  · Normal appearing left atrial appendage. No thrombus is present in the appendage. Normal appendage velocities.  · There is mild mitral prolapse of the posterior mitral leaflet without significant regurgitation.  · There is a small patent foramen ovale present with right to left shunting indicated by saline contrast.  · No atrial septal aneurysm appreciated.      Most Recent Nuclear Stress Test Results  Results for orders placed during the hospital encounter of 12/15/22    Nuclear Stress - Cardiology Interpreted    Interpretation Summary    Normal myocardial perfusion scan. There is no evidence of myocardial ischemia or infarction.    The visually estimated ejection fraction is normal at rest.    There is normal wall motion at rest.    The ECG portion of the study is negative for ischemia.    There are no prior studies for comparison.      Most Recent Cardiac PET Stress Test Results  No results found for this or any previous visit.      Most Recent Cardiovascular Angiogram results  No results found for this or any previous visit.      Other Most Recent Cardiology Results  Results for orders placed during the hospital encounter of 12/15/22    CARDIAC MONITORING STRIPS        All pertinent data including labs, imaging, EKGs, and studies listed above  were reviewed.  Patient's most recent EKG tracing was personally interpreted by this provider.    Diagnoses:       1. Mixed hyperlipidemia    2. PFO (patent foramen ovale)    3. Primary hypertension         Plan for treatment of the above diagnoses:     Assessment & Plan    - Switched antihypertensive medication from metoprolol succinate 25 mg daily to amlodipine 2.5 mg by mouth daily due to persistent blood pressure variability. Chose low-dose amlodipine to minimize side effect profile while addressing HTN.    Explained that blood pressure naturally fluctuates throughout the day.  Switched antihypertensive medication from metoprolol succinate 25 mg daily to amlodipine 2.5 mg by mouth daily due to persistent blood pressure variability.  Chose low-dose amlodipine to minimize side effect profile while addressing HTN.  Discussed potential rare side effect of mild leg swelling with amlodipine, typically occurring at higher doses.  Instructed patient to stop amlodipine and contact the office if experiencing leg swelling.  Follow up in about a year.        Continue aspirin 81 mg PO Daily   Change metoprolol succinate 25mg PO Daily to amlodipine 2.5mg PO Daily   Not taking statin, focusing diet at this time     Continue other cardiac medications  Mediterranean Diet/Cardiovascular Exercise Program    Visit today included increased complexity associated with the care of the episodic problem(s) addressed above in addition to managing the longitudinal care of the patient due to the serious and/or complex managed problem(s) listed above.    Parts of this note were transcribed using voice recognition and generative artificial intelligence software ([employer] and [employer]).  Please excuse any grammatical or syntax errors and reach out to me with any questions or clarifications needed.    Patient queried and all questions were answered.    F/u in 1 year to reassess      Signed:    David Mohr MD  4/17/2025 10:50 AM

## 2025-04-17 ENCOUNTER — OFFICE VISIT (OUTPATIENT)
Dept: CARDIOLOGY | Facility: CLINIC | Age: 70
End: 2025-04-17
Payer: MEDICARE

## 2025-04-17 VITALS
SYSTOLIC BLOOD PRESSURE: 157 MMHG | DIASTOLIC BLOOD PRESSURE: 83 MMHG | HEART RATE: 56 BPM | HEIGHT: 65 IN | WEIGHT: 160.06 LBS | BODY MASS INDEX: 26.67 KG/M2

## 2025-04-17 DIAGNOSIS — Q21.12 PFO (PATENT FORAMEN OVALE): ICD-10-CM

## 2025-04-17 DIAGNOSIS — I10 PRIMARY HYPERTENSION: ICD-10-CM

## 2025-04-17 DIAGNOSIS — E78.2 MIXED HYPERLIPIDEMIA: Primary | ICD-10-CM

## 2025-04-17 PROCEDURE — 99999 PR PBB SHADOW E&M-EST. PATIENT-LVL III: CPT | Mod: PBBFAC,,, | Performed by: INTERNAL MEDICINE

## 2025-04-17 PROCEDURE — 93005 ELECTROCARDIOGRAM TRACING: CPT | Mod: PO

## 2025-04-17 PROCEDURE — 99214 OFFICE O/P EST MOD 30 MIN: CPT | Mod: S$PBB,,, | Performed by: INTERNAL MEDICINE

## 2025-04-17 PROCEDURE — 99213 OFFICE O/P EST LOW 20 MIN: CPT | Mod: PBBFAC,25,PO | Performed by: INTERNAL MEDICINE

## 2025-04-17 PROCEDURE — 93010 ELECTROCARDIOGRAM REPORT: CPT | Mod: ,,, | Performed by: INTERNAL MEDICINE

## 2025-04-17 PROCEDURE — G2211 COMPLEX E/M VISIT ADD ON: HCPCS | Mod: S$PBB,,, | Performed by: INTERNAL MEDICINE

## 2025-04-17 RX ORDER — AMLODIPINE BESYLATE 2.5 MG/1
2.5 TABLET ORAL DAILY
Qty: 90 TABLET | Refills: 3 | Status: SHIPPED | OUTPATIENT
Start: 2025-04-17 | End: 2026-04-17

## 2025-04-19 LAB
OHS QRS DURATION: 98 MS
OHS QTC CALCULATION: 425 MS

## 2025-05-22 ENCOUNTER — PATIENT MESSAGE (OUTPATIENT)
Dept: OBSTETRICS AND GYNECOLOGY | Facility: CLINIC | Age: 70
End: 2025-05-22
Payer: MEDICARE